# Patient Record
Sex: FEMALE
[De-identification: names, ages, dates, MRNs, and addresses within clinical notes are randomized per-mention and may not be internally consistent; named-entity substitution may affect disease eponyms.]

---

## 2017-07-05 ENCOUNTER — HOSPITAL ENCOUNTER (OUTPATIENT)
Dept: HOSPITAL 31 - C.ER | Age: 42
Setting detail: OBSERVATION
Discharge: HOME | End: 2017-07-05
Attending: EMERGENCY MEDICINE | Admitting: EMERGENCY MEDICINE
Payer: SELF-PAY

## 2017-07-05 VITALS — BODY MASS INDEX: 10.8 KG/M2

## 2017-07-05 VITALS — DIASTOLIC BLOOD PRESSURE: 62 MMHG | SYSTOLIC BLOOD PRESSURE: 95 MMHG | HEART RATE: 82 BPM | TEMPERATURE: 97.9 F

## 2017-07-05 VITALS — RESPIRATION RATE: 16 BRPM

## 2017-07-05 VITALS — OXYGEN SATURATION: 97 %
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
## 2017-07-05 DIAGNOSIS — Z87.891: ICD-10-CM

## 2017-07-05 DIAGNOSIS — F10.129: Primary | ICD-10-CM

## 2017-07-05 NOTE — C.PDOC
History Of Present Illness





A 43 y/o F was found drunk in front of a house PTA. Admits to drinking ETOH. 

Denies suicidal, homicidal ideation, or any physical complaints.





Time Seen by Provider: 07/05/17 01:07


Chief Complaint (Nursing): Substance Abuse


History Per: Patient


History/Exam Limitations: no limitations


Onset/Duration Of Symptoms: Hrs


Current Symptoms Are (Timing): Still Present


Suicide/Self Injury Attempted (Context): None


Modifying Factor(s): Alcohol


Severity: Mild


Associated Symptoms: denies: Suicidal Thoughts, Suicidal Plan


Involuntary Hold By: None


Recent travel outside of the [place]: No


Additional History Per: Patient





Past Medical History


Reviewed: Historical Data, Nursing Documentation, Vital Signs


Vital Signs: 


 Last Vital Signs











Temp  97.9 F   07/05/17 05:29


 


Pulse  82   07/05/17 05:29


 


Resp  16   07/05/17 05:29


 


BP  95/62 L  07/05/17 05:29


 


Pulse Ox  98   07/05/17 05:29














- Medical History


PMH: Bronchitis, Gall Bladder Disease, Migraine


   Denies: Chronic Kidney Disease


Surgical History: Appendectomy (2001), Tonsillectomy (At 9yo)





- [facility] Procedures








APPLICATION OF SPLINT (12/02/14)








Family History: States: Unknown Family Hx





- Social History


Hx Tobacco Use: Yes


Hx Alcohol Use: Yes


Hx Substance Use: Yes





- Immunization History


Hx Tetanus Toxoid Vaccination: No


Hx Influenza Vaccination: No


Hx Pneumococcal Vaccination: No





Review Of Systems


Constitutional: Positive for: Other (ETOH intoxicated).  Negative for: Fever, 

Chills, Sweats


Cardiovascular: Negative for: Chest Pain, Palpitations


Respiratory: Negative for: Shortness of Breath


Gastrointestinal: Negative for: Nausea, Vomiting, Abdominal Pain, Diarrhea


Skin: Negative for: Rash, Lesions, Bruising


Neurological: Negative for: Dizziness


Psych: Negative for: Anxiety, Suicidal ideation, Other (Homicidal ideation)





Physical Exam





- Physical Exam


Appears: Non-toxic, No Acute Distress, Other (ETOH intoxicated, (+) AOB, no 

signs of visible trauma)


Skin: Warm, Dry


Head: Atraumatic


Eye(s): bilateral: Normal Inspection


Oral Mucosa: Moist


Neck: Trachea Midline, Supple


Chest: Symmetrical


Cardiovascular: Rhythm Regular


Respiratory: No Rales, No Rhonchi, No Wheezing


Gastrointestinal/Abdominal: Soft, No Tenderness


Back: No CVA Tenderness


Neurological/Psych: Oriented x3 (Awake and alert)


Gait: Unsteady





ED Course And Treatment


O2 Sat by Pulse Oximetry: 97 (RA)


Pulse Ox Interpretation: Normal


Reevaluation Time: 05:48


Reassessment Condition: Improved





ED OBSERVATION


Discharge: Yes


Date of observation admission: 07/05/17


Time of observation admission: 01:34





- Observation admission statement


Patient is being placed in observation because:: 





acute alcohol intoxication








- Goals of Observation


Goals of observation are:: 





sobriety








- Progress Note


Progress Note: 





07/05/17 01:35


vitals stable





Disposition


Counseled Patient/Family Regarding: Studies Performed, Diagnosis, Need For 

Followup





- Disposition


Disposition: HOME/ ROUTINE


Disposition Time: 01:07


Condition: FAIR





- Clinical Impression


Clinical Impression: 


 Alcohol intoxication








- Scribe Statement


The provider has reviewed the documentation as recorded by the Scribsebastian snyder





All medical record entries made by the Beniibsebastian were at my direction and 

personally dictated by me. I have reviewed the chart and agree that the record 

accurately reflects my personal performance of the history, physical exam, 

medical decision making, and the department course for this patient. I have 

also personally directed, reviewed, and agree with the discharge instructions 

and disposition.

## 2018-01-29 ENCOUNTER — HOSPITAL ENCOUNTER (EMERGENCY)
Dept: HOSPITAL 31 - C.ER | Age: 43
Discharge: HOME | End: 2018-01-29
Payer: COMMERCIAL

## 2018-01-29 VITALS
DIASTOLIC BLOOD PRESSURE: 82 MMHG | RESPIRATION RATE: 19 BRPM | HEART RATE: 88 BPM | SYSTOLIC BLOOD PRESSURE: 120 MMHG | OXYGEN SATURATION: 95 % | TEMPERATURE: 97.7 F

## 2018-01-29 VITALS — BODY MASS INDEX: 25.9 KG/M2

## 2018-01-29 DIAGNOSIS — J18.9: Primary | ICD-10-CM

## 2018-01-29 DIAGNOSIS — Z87.891: ICD-10-CM

## 2018-01-29 PROCEDURE — 96365 THER/PROPH/DIAG IV INF INIT: CPT

## 2018-01-29 PROCEDURE — 87804 INFLUENZA ASSAY W/OPTIC: CPT

## 2018-01-29 PROCEDURE — 96372 THER/PROPH/DIAG INJ SC/IM: CPT

## 2018-01-29 PROCEDURE — 99283 EMERGENCY DEPT VISIT LOW MDM: CPT

## 2018-01-29 PROCEDURE — 71046 X-RAY EXAM CHEST 2 VIEWS: CPT

## 2018-01-29 PROCEDURE — 94640 AIRWAY INHALATION TREATMENT: CPT

## 2018-01-29 RX ADMIN — ALBUTEROL SULFATE SCH MG: 2.5 SOLUTION RESPIRATORY (INHALATION) at 16:15

## 2018-01-29 RX ADMIN — ALBUTEROL SULFATE SCH MG: 2.5 SOLUTION RESPIRATORY (INHALATION) at 16:00

## 2018-01-29 NOTE — C.PDOC
History Of Present Illness


43 y/o female presents to ED with complaints of right rib pain developing 4 am 

this morning associated with cough she has had for 2 weeks. Patient states she 

has had cold like symptoms and now developing rib pain she thinks is because of 

too much coughing. Patient denies fever, chills, abdominal pain, sob or any 

other complaints at this time. 


Time Seen by Provider: 18 13:48


Chief Complaint (Nursing): Cough, Cold, Congestion


History Per: Patient


History/Exam Limitations: no limitations


Onset/Duration Of Symptoms: Hrs


Current Symptoms Are (Timing): Still Present


Associated Symptoms: Cough





Past Medical History


Reviewed: Historical Data, Nursing Documentation, Vital Signs


Vital Signs: 


 Last Vital Signs











Temp  100.3 F H  18 12:46


 


Pulse  85   18 12:46


 


Resp  18   18 12:46


 


BP  125/80   18 12:46


 


Pulse Ox  96   18 15:37














- Medical History


PMH: Bronchitis, Gall Bladder Disease, Migraine


Surgical History: Appendectomy (), Tonsillectomy (At 9yo)





- [facility] Procedures








APPLICATION OF SPLINT (14)








Family History: States: No Known Family Hx





- Social History


Hx Tobacco Use: Yes


Hx Alcohol Use: Yes


Hx Substance Use: Yes





- Immunization History


Hx Tetanus Toxoid Vaccination: No


Hx Influenza Vaccination: No


Hx Pneumococcal Vaccination: No





Review Of Systems


Constitutional: Negative for: Fever, Chills


Respiratory: Positive for: Cough


Gastrointestinal: Negative for: Nausea, Vomiting, Abdominal Pain


Musculoskeletal: Positive for: Other (rib pain)





Physical Exam





- Physical Exam


Appears: Non-toxic, No Acute Distress


Skin: Warm, Dry, No Rash


Head: Atraumatic, Normacephalic


Eye(s): bilateral: Normal Inspection


Oral Mucosa: Moist


Neck: Normal ROM, Supple


Cardiovascular: Rhythm Regular


Respiratory: Normal Breath Sounds, No Rales, No Rhonchi, No Wheezing


Gastrointestinal/Abdominal: Soft, No Tenderness, No Guarding, No Rebound


Extremity: Normal ROM, Capillary Refill (<2 seconds)


Neurological/Psych: Oriented x3





ED Course And Treatment


ECG: Interpreted By Me, Viewed By Me


ECG Rhythm: Sinus Rhythm


Rate From EC (bpm)


O2 Sat by Pulse Oximetry: 96 (RA)


Pulse Ox Interpretation: Normal





- Other Rad


  ** CXR


X-Ray: Viewed By Me, Read By Radiologist


Interpretation: IMPRESSION:  Right lower lobe opacities noted within the medial 

right lower lobe as well as right mid to lower lateral lung zone possibly 

pleural based opacities. Correlate clinically for pneumonia. Recommend follow-

up to complete resolution.  Small right pleural effusion.  Mild biapical 

pleural thickening.  Mild cardiomegaly.


Progress Note: ECG, CXR ordered. Influenza test. Blood work, UA ordered.  CXR 

showed right lower lobe infiltrate.  Antibiotics IV given at ED.  Patient will 

be discharged with PO antibiotics





Disposition


Counseled Patient/Family Regarding: Studies Performed, Diagnosis, Need For 

Followup, Rx Given





- Disposition


Referrals: 


[facility] at [facility] [Outside]


Disposition: HOME/ ROUTINE


Disposition Time: 16:47


Condition: STABLE


Additional Instructions: 


Follow up with your doctor or our clinic. 


Prescriptions: 


Azithromycin 1 tab PO DAILY #6 tab


Ibuprofen [Motrin] 1 tab PO TID PRN #30 tab


 PRN Reason: Pain


Instructions:  Community Acquired Pneumonia (ED)


Forms:  [facility] (English), Work Excuse





- POA


Present On Arrival: None





- Clinical Impression


Clinical Impression: 


 Pneumonia








- Scribe Statement


The provider has reviewed the documentation as recorded by the Scribsebastian Perez





All medical record entries made by the Beniibsebastian were at my direction and 

personally dictated by me. I have reviewed the chart and agree that the record 

accurately reflects my personal performance of the history, physical exam, 

medical decision making, and the department course for this patient. I have 

also personally directed, reviewed, and agree with the discharge instructions 

and disposition.

## 2018-01-29 NOTE — RAD
HISTORY:

SOB  



COMPARISON:

Chest x-ray performed 4/25/17 



TECHNIQUE:

Chest PA and lateral



FINDINGS:





LUNGS:

Right lower lobe opacities noted within the medial right lower lobe 

as well as right mid to lower lateral lung zone possibly pleural 

based opacities. Recommend follow-up to complete resolution. Small 

right pleural effusion. No definite pneumothorax.  Mild biapical 

pleural thickening.



Please note that chest x-ray has limited sensitivity for the 

detection of pulmonary masses.



CARDIOVASCULAR:

Mild cardiomegaly.



OSSEOUS STRUCTURES:

 Degenerative changes.



VISUALIZED UPPER ABDOMEN:

Unremarkable.



OTHER FINDINGS:

None.



IMPRESSION:

Right lower lobe opacities noted within the medial right lower lobe 

as well as right mid to lower lateral lung zone possibly pleural 

based opacities. Correlate clinically for pneumonia. Recommend 

follow-up to complete resolution. 



Small right pleural effusion.



Mild biapical pleural thickening. 



Mild cardiomegaly.

## 2019-03-12 ENCOUNTER — HOSPITAL ENCOUNTER (OUTPATIENT)
Dept: HOSPITAL 31 - C.ER | Age: 44
Setting detail: OBSERVATION
LOS: 1 days | Discharge: HOME | End: 2019-03-13
Attending: FAMILY MEDICINE | Admitting: FAMILY MEDICINE
Payer: SELF-PAY

## 2019-03-12 VITALS — BODY MASS INDEX: 25.9 KG/M2

## 2019-03-12 DIAGNOSIS — I73.00: ICD-10-CM

## 2019-03-12 DIAGNOSIS — R07.89: ICD-10-CM

## 2019-03-12 DIAGNOSIS — I10: ICD-10-CM

## 2019-03-12 DIAGNOSIS — Z82.49: ICD-10-CM

## 2019-03-12 DIAGNOSIS — F14.10: Primary | ICD-10-CM

## 2019-03-12 DIAGNOSIS — Z90.49: ICD-10-CM

## 2019-03-12 DIAGNOSIS — M34.9: ICD-10-CM

## 2019-03-12 LAB
ALBUMIN SERPL-MCNC: 4.8 G/DL (ref 3.5–5)
ALBUMIN/GLOB SERPL: 1.5 {RATIO} (ref 1–2.1)
ALT SERPL-CCNC: 78 U/L (ref 9–52)
APTT BLD: 38 SECONDS (ref 21–34)
AST SERPL-CCNC: 123 U/L (ref 14–36)
BASOPHILS # BLD AUTO: 0.1 K/UL (ref 0–0.2)
BASOPHILS NFR BLD: 1.1 % (ref 0–2)
BUN SERPL-MCNC: 6 MG/DL (ref 7–17)
CALCIUM SERPL-MCNC: 9.8 MG/DL (ref 8.6–10.4)
EOSINOPHIL # BLD AUTO: 0.4 K/UL (ref 0–0.7)
EOSINOPHIL NFR BLD: 7.8 % (ref 0–4)
ERYTHROCYTE [DISTWIDTH] IN BLOOD BY AUTOMATED COUNT: 15.7 % (ref 11.5–14.5)
GFR NON-AFRICAN AMERICAN: > 60
HGB BLD-MCNC: 14.2 G/DL (ref 11–16)
INR PPP: 0.9
LYMPHOCYTES # BLD AUTO: 1.3 K/UL (ref 1–4.3)
LYMPHOCYTES NFR BLD AUTO: 23.9 % (ref 20–40)
MCH RBC QN AUTO: 35.8 PG (ref 27–31)
MCHC RBC AUTO-ENTMCNC: 32.8 G/DL (ref 33–37)
MCV RBC AUTO: 109.4 FL (ref 81–99)
MONOCYTES # BLD: 0.7 K/UL (ref 0–0.8)
MONOCYTES NFR BLD: 12 % (ref 0–10)
NEUTROPHILS # BLD: 3 K/UL (ref 1.8–7)
NEUTROPHILS NFR BLD AUTO: 55.2 % (ref 50–75)
NRBC BLD AUTO-RTO: 0.2 % (ref 0–2)
PLATELET # BLD: 202 K/UL (ref 130–400)
PMV BLD AUTO: 8.9 FL (ref 7.2–11.7)
PROTHROMBIN TIME: 9.9 SECONDS (ref 9.7–12.2)
RBC # BLD AUTO: 3.96 MIL/UL (ref 3.8–5.2)
WBC # BLD AUTO: 5.4 K/UL (ref 4.8–10.8)

## 2019-03-12 PROCEDURE — 76700 US EXAM ABDOM COMPLETE: CPT

## 2019-03-12 PROCEDURE — 93005 ELECTROCARDIOGRAM TRACING: CPT

## 2019-03-12 PROCEDURE — 80074 ACUTE HEPATITIS PANEL: CPT

## 2019-03-12 PROCEDURE — 85730 THROMBOPLASTIN TIME PARTIAL: CPT

## 2019-03-12 PROCEDURE — 86706 HEP B SURFACE ANTIBODY: CPT

## 2019-03-12 PROCEDURE — 80053 COMPREHEN METABOLIC PANEL: CPT

## 2019-03-12 PROCEDURE — 84100 ASSAY OF PHOSPHORUS: CPT

## 2019-03-12 PROCEDURE — 36415 COLL VENOUS BLD VENIPUNCTURE: CPT

## 2019-03-12 PROCEDURE — 84484 ASSAY OF TROPONIN QUANT: CPT

## 2019-03-12 PROCEDURE — 85025 COMPLETE CBC W/AUTO DIFF WBC: CPT

## 2019-03-12 PROCEDURE — 71045 X-RAY EXAM CHEST 1 VIEW: CPT

## 2019-03-12 PROCEDURE — 84443 ASSAY THYROID STIM HORMONE: CPT

## 2019-03-12 PROCEDURE — 83735 ASSAY OF MAGNESIUM: CPT

## 2019-03-12 PROCEDURE — 85610 PROTHROMBIN TIME: CPT

## 2019-03-12 PROCEDURE — 80061 LIPID PANEL: CPT

## 2019-03-12 PROCEDURE — 70450 CT HEAD/BRAIN W/O DYE: CPT

## 2019-03-12 NOTE — C.PDOC
History Of Present Illness


44 y/o female presents for evaluation of pressure-like chest pain since 

yesterday. Pain is described as mid-sternal, non-radiating, and began while 

patient was sitting down, non-exertional. Patient reports associated SOB. At 

present she is asymptomatic and has no pain. Patient did not take any medication

PTA. Patient admits she had similar pain last year, and underwent a stress test 

that was abnormal, but nothing came of it. Additionally, patient states that she

had been feeling dizzy for about a week when a few days ago she fell, hit her 

head, and passed out. She denies any other injury. No recent travel. No recent 

hospitalizations. Patient admits to mild cough. She denies any fevers, chills, 

nausea, vomiting, or change in bowel movements. Patients blood pressure was 

found to be elevated at 175/131 on arrival. At time of examination BP is now 

155/92, without medical intervention. Patient states she has no prior hx of 

hypertension. 





Time Seen by Provider: 19 19:12


Chief Complaint (Nursing): Chest Pain


History Per: Patient


History/Exam Limitations: no limitations


Onset/Duration Of Symptoms: Days (x 1)


Current Symptoms Are (Timing): Gone


Quality: Pressure


Alleviating Factors: None





Past Medical History


Reviewed: Historical Data, Nursing Documentation, Vital Signs


Vital Signs: 





                                Last Vital Signs











Temp  98.4 F   19 17:56


 


Pulse  84   19 19:05


 


Resp  17   19 19:05


 


BP  155/92 H  19 19:05


 


Pulse Ox  97   19 19:05














- Medical History


PMH: Bronchitis, Gall Bladder Disease, Migraine


   Denies: Chronic Kidney Disease


Surgical History: Appendectomy (), Tonsillectomy (At 11yo)





- [facility] Procedures











APPLICATION OF SPLINT (14)








Family History: States: Unknown Family Hx





- Social History


Hx Tobacco Use: Yes


Hx Alcohol Use: Yes


Hx Substance Use: Yes





- Immunization History


Hx Tetanus Toxoid Vaccination: No


Hx Influenza Vaccination: No


Hx Pneumococcal Vaccination: No





Review Of Systems


Except As Marked, All Systems Reviewed And Found Negative.


Constitutional: Negative for: Fever


Eyes: Negative for: Vision Change


Cardiovascular: Positive for: Chest Pain.  Negative for: Palpitations


Respiratory: Positive for: Cough, Shortness of Breath.  Negative for: Pleuritic 

Pain


Gastrointestinal: Negative for: Nausea, Vomiting, Abdominal Pain, Diarrhea


Musculoskeletal: Negative for: Back Pain


Skin: Negative for: Rash


Neurological: Positive for: Dizziness, Other (Syncopal episode).  Negative for: 

Weakness, Numbness, Headache





Physical Exam





- Physical Exam


Appears: Non-toxic, No Acute Distress


Skin: Warm, Dry, No Rash


Head: Normacephalic, Swelling (Hematoma to the right forehead)


Eye(s): bilateral: Normal Inspection (conjunctiva clear), PERRL, EOMI


Oral Mucosa: Moist


Neck: Normal ROM


Chest: Symmetrical, No Tenderness


Cardiovascular: Rhythm Regular, No Murmur, Other (Normal S1,S2)


Respiratory: No Rales, No Rhonchi, No Wheezing, Other (Lungs CTA bilaterally)


Gastrointestinal/Abdominal: Soft, No Tenderness, No Distention, No Guarding, No 

Rebound


Extremity: Bilateral: Atraumatic, Normal Color And Temperature, Other (no 

cyanosis or edema)


Pulses: Left Dorsalis Pedis: Normal, Right Dorsalis Pedis: Normal


Neurological/Psych: Oriented x3, Normal Speech, Normal Cranial Nerves (2-12 

intact), Normal Motor, Normal Sensation, Other (GCS of 15)





ED Course And Treatment





- Laboratory Results


Result Diagrams: 


                                 19 20:03





                                 19 20:03


ECG: Interpreted By Me


ECG Rhythm: Sinus Rhythm


Interpretation Of ECG: Normal intervals, Right axis deviation, No ST elevations 

or depressions


Rate From EC


O2 Sat by Pulse Oximetry: 97 (RA)


Pulse Ox Interpretation: Normal





- CT Scan/US


  ** CT Head


Other Rad Studies (CT/US): Read By Radiologist, Radiology Report Reviewed


CT/US Interpretation: EXAM:  CT Head without Intravenous Contrast.  CLINICAL 

HISTORY:  Trauma/syncope.  TECHNIQUE:  Axial computed tomography images of the 

head/brain without intravenous contrast.  COMPARISON:  None provided.  FINDINGS:

 BRAIN.  No acute intraparenchymal hemorrhage. No mass lesion. No CT evidence 

for acute territorial infarct. No midline shift or extra-axial collections.  

VENTRICLES:  No hydrocephalus.  ORBITS:  The orbits are unremarkable.  SINUSES 

AND MASTOIDS:  Bilateral ethmoid and maxillary sinusitis.  The mastoid air cells

are clear.  BONES:  No fracture.  SOFT TISSUES:  Unremarkable.  IMPRESSION:  S

inusitis.  No acute intracranial abnormality.





Medical Decision Making


Medical Decision Making: 





Impression: Chest pain





Plan:


- EKG


- CXR


- Labs


- Head CT





Progress:


CT shows sinusitis, no acute abnormality.





Discussed lab results with patient, urine positive for cocaine, she reports 

chest pain at this time, advised she should be admitted, patient agrees with 

plan.





Spoke with Dr. Juarez who accepted patient.








Disposition


Counseled Patient/Family Regarding: Studies Performed, Diagnosis





- Disposition


Disposition: HOSPITALIZED


Disposition Time: 22:00


Condition: STABLE





- Clinical Impression


Clinical Impression: 


 Cocaine abuse, Chest pain








- Scribe Statement


The provider has reviewed the documentation as recorded by the Shana Moss





Provider Attestation: 


All medical record entries made by the Shana were at my direction and 

personally dictated by me. I have reviewed the chart and agree that the record 

accurately reflects my personal performance of the history, physical exam, 

medical decision making, and the department course for this patient. I have also

personally directed, reviewed, and agree with the discharge instructions and 

disposition.

## 2019-03-12 NOTE — CP.PCM.HP
<Will Toro - Last Filed: 19 04:34>





History of Present Illness





- History of Present Illness


History of Present Illness: 





H&P for Dr. Juarez.





43 F w/ PMhx of HTN, schleroderma, raynauds presents to ED for 2 day of chest  

pain. Patient states pain for past 2 days was intermit, w/ radiation to R 

shoulder, 7/10 at its peak. Pt reports SOB associated with chest pain & 

dizziness. Patient reports having gotten dizzy approximately 2 days prior, 

resulting her to fall and hit her head against furniture. Patient denies recent 

travel, ill contacts, being sick. 





ROS: Denies abdominal pain, lower extremity pain, constipation, diarrhea, 

nausea, vomiting 





PMD: Denies 


PMHx: HTN 


PSHx: Appendectomy, tonsilectomy


Meds: None


Allergies: Pseudoephedrine anaphylaxis


Family Hx: father MI in 40s,  of CHF at 71; mother,  of CVA at 54; 

brother, MI at 38


SHx: + cocaine use last week, denies ETOH (previous hx ETOH abuse), denies other

drug use, smokes 1 pack per day, 20 + year, denies ETOH use  





Present on Admission





- Present on Admission


Any Indicators Present on Admission: No


History of DVT/PE: No


History of Uncontrolled Diabetes: No


Urinary Catheter: No


Decubitus Ulcer Present: No





Review of Systems





- Constitutional


Constitutional: absent: Chills, Fever





- EENT


Eyes: absent: Blurred Vision, Change in Vision


Nose/Mouth/Throat: absent: Nasal Congestion, Nose Pain





- Cardiovascular


Cardiovascular: Chest Pain, Chest Pain at Rest.  absent: Dyspnea





- Respiratory


Respiratory: Dyspnea





- Gastrointestinal


Gastrointestinal: absent: Abdominal Pain, Bloating





- Genitourinary


Genitourinary: absent: Change in Urinary Stream, Hematuria





- Musculoskeletal


Musculoskeletal: absent: Arthralgias, Myalgias





- Neurological


Neurological: absent: Abnormal Gait, Abnormal Hearing





- Psychiatric


Psychiatric: absent: Confusion, Depression





Past Patient History





- Past Medical History & Family History


Past Medical History?: Yes





- Past Social History


Smoking Status: Heavy Smoker > 10 Cigarettes Daily





- CARDIAC


Hx Cardiac Disorders: Yes (See comment)


Hx Circulatory Problems: Yes (Reynaud's)


Other/Comment: Scleraderma.  Systematic sclorosis





- PULMONARY


Hx Bronchitis: Yes





- NEUROLOGICAL


Hx Migraine: Yes





- HEENT


Hx HEENT Problems: Yes


Hx Epistaxis: Yes





- RENAL


Hx Chronic Kidney Disease: No





- ENDOCRINE/METABOLIC


Hx Endocrine Disorders: No





- HEMATOLOGICAL/ONCOLOGICAL


Hx Blood Disorders: No





- INTEGUMENTARY


Hx Dermatological Problems: No





- MUSCULOSKELETAL/RHEUMATOLOGICAL


Hx Musculoskeletal Disorders: Yes


Hx Back Pain: Yes


Hx Falls: No





- GASTROINTESTINAL


Hx Gall Bladder Disease: Yes





- GENITOURINARY/GYNECOLOGICAL


Hx Genitourinary Disorders: No





- PSYCHIATRIC


Hx Substance Use: Yes





- SURGICAL HISTORY


Hx Appendectomy: Yes ()


Hx Tonsillectomy: Yes (At 11yo)





- ANESTHESIA


Hx Anesthesia: Yes


Hx Anesthesia Reactions: No


Hx Malignant Hyperthermia: No





Meds


Allergies/Adverse Reactions: 


                                    Allergies











Allergy/AdvReac Type Severity Reaction Status Date / Time


 


pseudoephedrine HCl Allergy  ANGIOEDEMA Verified 18 12:44





[From Sudafed]     














Physical Exam





- Constitutional


Appears: Non-toxic, No Acute Distress





- Head Exam


Additional comments: 





R forehead bruise/ resolving  





- Eye Exam


Eye Exam: EOMI, Normal appearance


Pupil Exam: NORMAL ACCOMODATION





- ENT Exam


ENT Exam: Mucous Membranes Moist





- Respiratory Exam


Respiratory Exam: Clear to Auscultation Bilateral, NORMAL BREATHING PATTERN.  

absent: Rales, Rhonchi, Wheezes





- Cardiovascular Exam


Cardiovascular Exam: +S1, +S2.  absent: Systolic Murmur





- GI/Abdominal Exam


GI & Abdominal Exam: Normal Bowel Sounds, Soft, Tenderness.  absent: Distended, 

Firm


Additional comments: 





+ muprhy sign 





- Extremities Exam


Extremities exam: Positive for: full ROM, normal inspection.  Negative for: calf

tenderness, pedal edema





- Back Exam


Back exam: CVA tenderness (L), CVA tenderness (R)





- Neurological Exam


Neurological exam: Alert, Oriented x3





- Psychiatric Exam


Psychiatric exam: Normal Affect, Normal Mood





- Skin


Skin Exam: Dry, Intact, Normal Color, Warm





Results





- Vital Signs


Recent Vital Signs: 





                                Last Vital Signs











Temp  98.4 F   19 17:56


 


Pulse  84   19 19:05


 


Resp  17   19 19:05


 


BP  155/92 H  19 19:05


 


Pulse Ox  97   19 22:14














- Labs


Result Diagrams: 


                                 19 20:03





                                 19 20:03


Labs: 





                         Laboratory Results - last 24 hr











  19





  20:03 20:03 20:03


 


WBC  5.4  


 


RBC  3.96  


 


Hgb  14.2  


 


Hct  43.3  


 


MCV  109.4 H D  


 


MCH  35.8 H  


 


MCHC  32.8 L  


 


RDW  15.7 H  


 


Plt Count  202  


 


MPV  8.9  


 


Neut % (Auto)  55.2  


 


Lymph % (Auto)  23.9  


 


Mono % (Auto)  12.0 H  


 


Eos % (Auto)  7.8 H  


 


Baso % (Auto)  1.1  


 


Neut # (Auto)  3.0  


 


Lymph # (Auto)  1.3  


 


Mono # (Auto)  0.7  


 


Eos # (Auto)  0.4  


 


Baso # (Auto)  0.1  


 


PT   9.9 


 


INR   0.9 


 


APTT   38 H 


 


Sodium    140


 


Potassium    3.6


 


Chloride    101


 


Carbon Dioxide    28


 


Anion Gap    15


 


BUN    6 L


 


Creatinine    0.7


 


Est GFR ( Amer)    > 60


 


Est GFR (Non-Af Amer)    > 60


 


Random Glucose    53 L D


 


Calcium    9.8


 


Total Bilirubin    0.9


 


AST    123 H


 


ALT    78 H


 


Alkaline Phosphatase    80


 


Troponin I    < 0.0120


 


Total Protein    8.1


 


Albumin    4.8


 


Globulin    3.3


 


Albumin/Globulin Ratio    1.5


 


Urine Opiates Screen   


 


Urine Methadone Screen   


 


Ur Barbiturates Screen   


 


Ur Phencyclidine Scrn   


 


Ur Amphetamines Screen   


 


U Benzodiazepines Scrn   


 


U Oth Cocaine Metabols   


 


U Cannabinoids Screen   














  19





  20:45


 


WBC 


 


RBC 


 


Hgb 


 


Hct 


 


MCV 


 


MCH 


 


MCHC 


 


RDW 


 


Plt Count 


 


MPV 


 


Neut % (Auto) 


 


Lymph % (Auto) 


 


Mono % (Auto) 


 


Eos % (Auto) 


 


Baso % (Auto) 


 


Neut # (Auto) 


 


Lymph # (Auto) 


 


Mono # (Auto) 


 


Eos # (Auto) 


 


Baso # (Auto) 


 


PT 


 


INR 


 


APTT 


 


Sodium 


 


Potassium 


 


Chloride 


 


Carbon Dioxide 


 


Anion Gap 


 


BUN 


 


Creatinine 


 


Est GFR ( Amer) 


 


Est GFR (Non-Af Amer) 


 


Random Glucose 


 


Calcium 


 


Total Bilirubin 


 


AST 


 


ALT 


 


Alkaline Phosphatase 


 


Troponin I 


 


Total Protein 


 


Albumin 


 


Globulin 


 


Albumin/Globulin Ratio 


 


Urine Opiates Screen  Negative


 


Urine Methadone Screen  Negative


 


Ur Barbiturates Screen  Negative


 


Ur Phencyclidine Scrn  Negative


 


Ur Amphetamines Screen  Negative


 


U Benzodiazepines Scrn  Negative


 


U Oth Cocaine Metabols  Positive H


 


U Cannabinoids Screen  Negative














Assessment & Plan





- Assessment and Plan (Free Text)


Assessment: 





43 F w/ PMhx of HTN, raynauds, scleroderma, presents with dizziness and chest 

pain 


Plan: 





Chest Pain


R/o ACS


- UDS cocaine +


- EKG NSR w/  BPM


- CXRAY: barel shape chest, no acute disease identified, will f/u official read


- SHINE X 2 negative F/u 3rd SHINE


- F/u Lipid, TSH 


- ECHO   EF 65%


- c/w aspirin 81 mg daily





Abdominal pain


- F/u Abd U/S


- F/u hepatitis panel 


- T. Bili 0/9


- AST/ ALT elevated 123/78





History of Hypertensive


- Avoid beta blockers 


- BP 150s, 1 X IV enalapril 2.5 mg, will monitor 





PPx


- DVT: lovenox 40 SC, SCDs


- heart health diet  





<Roly Juarez - Last Filed: 19 05:53>





Results





- Vital Signs


Recent Vital Signs: 





                                Last Vital Signs











Temp  98.2 F   19 03:02


 


Pulse  60   19 03:02


 


Resp  20   19 03:02


 


BP  155/91 H  19 03:02


 


Pulse Ox  97   19 03:02














- Labs


Result Diagrams: 


                                 19 04:59





                                 19 20:03


Labs: 





                         Laboratory Results - last 24 hr











  19





  20:03 20:03 20:03


 


WBC  5.4  


 


RBC  3.96  


 


Hgb  14.2  


 


Hct  43.3  


 


MCV  109.4 H D  


 


MCH  35.8 H  


 


MCHC  32.8 L  


 


RDW  15.7 H  


 


Plt Count  202  


 


MPV  8.9  


 


Neut % (Auto)  55.2  


 


Lymph % (Auto)  23.9  


 


Mono % (Auto)  12.0 H  


 


Eos % (Auto)  7.8 H  


 


Baso % (Auto)  1.1  


 


Neut # (Auto)  3.0  


 


Lymph # (Auto)  1.3  


 


Mono # (Auto)  0.7  


 


Eos # (Auto)  0.4  


 


Baso # (Auto)  0.1  


 


PT   9.9 


 


INR   0.9 


 


APTT   38 H 


 


Sodium    140


 


Potassium    3.6


 


Chloride    101


 


Carbon Dioxide    28


 


Anion Gap    15


 


BUN    6 L


 


Creatinine    0.7


 


Est GFR ( Amer)    > 60


 


Est GFR (Non-Af Amer)    > 60


 


Random Glucose    53 L D


 


Calcium    9.8


 


Total Bilirubin    0.9


 


AST    123 H


 


ALT    78 H


 


Alkaline Phosphatase    80


 


Total Creatine Kinase   


 


CK-MB (Mass)   


 


Troponin I    < 0.0120


 


Total Protein    8.1


 


Albumin    4.8


 


Globulin    3.3


 


Albumin/Globulin Ratio    1.5


 


LDL Cholesterol Direct   


 


TSH 3rd Generation   


 


Urine Opiates Screen   


 


Urine Methadone Screen   


 


Ur Barbiturates Screen   


 


Ur Phencyclidine Scrn   


 


Ur Amphetamines Screen   


 


U Benzodiazepines Scrn   


 


U Oth Cocaine Metabols   


 


U Cannabinoids Screen   


 


Alcohol, Quantitative   


 


Hep Bs Antigen   














  19





  20:45 04:09 04:59


 


WBC   


 


RBC   


 


Hgb   


 


Hct   


 


MCV   


 


MCH   


 


MCHC   


 


RDW   


 


Plt Count   


 


MPV   


 


Neut % (Auto)   


 


Lymph % (Auto)   


 


Mono % (Auto)   


 


Eos % (Auto)   


 


Baso % (Auto)   


 


Neut # (Auto)   


 


Lymph # (Auto)   


 


Mono # (Auto)   


 


Eos # (Auto)   


 


Baso # (Auto)   


 


PT   


 


INR   


 


APTT   


 


Sodium   


 


Potassium   


 


Chloride   


 


Carbon Dioxide   


 


Anion Gap   


 


BUN   


 


Creatinine   


 


Est GFR ( Amer)   


 


Est GFR (Non-Af Amer)   


 


Random Glucose   


 


Calcium   


 


Total Bilirubin   


 


AST   


 


ALT   


 


Alkaline Phosphatase   


 


Total Creatine Kinase   62 


 


CK-MB (Mass)   0.41 


 


Troponin I   0.0150 


 


Total Protein   


 


Albumin   


 


Globulin   


 


Albumin/Globulin Ratio   


 


LDL Cholesterol Direct   


 


TSH 3rd Generation   


 


Urine Opiates Screen  Negative  


 


Urine Methadone Screen  Negative  


 


Ur Barbiturates Screen  Negative  


 


Ur Phencyclidine Scrn  Negative  


 


Ur Amphetamines Screen  Negative  


 


U Benzodiazepines Scrn  Negative  


 


U Oth Cocaine Metabols  Positive H  


 


U Cannabinoids Screen  Negative  


 


Alcohol, Quantitative   < 10 


 


Hep Bs Antigen    Negative














  19





  04:59 04:59


 


WBC  4.7 L 


 


RBC  3.86 


 


Hgb  13.8 


 


Hct  42.1 


 


MCV  109.2 H 


 


MCH  35.8 H 


 


MCHC  32.8 L 


 


RDW  15.2 H 


 


Plt Count  179 


 


MPV  8.7 


 


Neut % (Auto)  47.4 L 


 


Lymph % (Auto)  30.6 


 


Mono % (Auto)  13.4 H 


 


Eos % (Auto)  7.4 H 


 


Baso % (Auto)  1.2 


 


Neut # (Auto)  2.2 


 


Lymph # (Auto)  1.4 


 


Mono # (Auto)  0.6 


 


Eos # (Auto)  0.3 


 


Baso # (Auto)  0.1 


 


PT  


 


INR  


 


APTT  


 


Sodium  


 


Potassium  


 


Chloride  


 


Carbon Dioxide  


 


Anion Gap  


 


BUN  


 


Creatinine  


 


Est GFR ( Amer)  


 


Est GFR (Non-Af Amer)  


 


Random Glucose  


 


Calcium  


 


Total Bilirubin  


 


AST  


 


ALT  


 


Alkaline Phosphatase  


 


Total Creatine Kinase  


 


CK-MB (Mass)  


 


Troponin I  


 


Total Protein  


 


Albumin  


 


Globulin  


 


Albumin/Globulin Ratio  


 


LDL Cholesterol Direct   113


 


TSH 3rd Generation   4.45


 


Urine Opiates Screen  


 


Urine Methadone Screen  


 


Ur Barbiturates Screen  


 


Ur Phencyclidine Scrn  


 


Ur Amphetamines Screen  


 


U Benzodiazepines Scrn  


 


U Oth Cocaine Metabols  


 


U Cannabinoids Screen  


 


Alcohol, Quantitative  


 


Hep Bs Antigen  














Assessment & Plan





- Date & Time


Date: 19 (I have seen and examined the patient.  I agree with the findings

and plan of care as documented by Dr. Toro.  Patient with chest pain.  Cocaine 

abuse.  Avoid beta blockers.  ROMIx3 with EKG.  Aspirin and Statin.  Abdominal 

pain.  Check Ultrasound.  Check hep panel.  History of scleroderma.  Not 

currently on any medications.  Monitor for acute changes.)


Time: 05:51





Attending/Attestation





- Attestation


I have personally seen and examined this patient.: Yes


I have fully participated in the care of the patient.: Yes


I have reviewed all pertinent clinical information: Yes

## 2019-03-13 VITALS — RESPIRATION RATE: 20 BRPM | OXYGEN SATURATION: 98 % | TEMPERATURE: 97.4 F

## 2019-03-13 VITALS — HEART RATE: 64 BPM | DIASTOLIC BLOOD PRESSURE: 74 MMHG | SYSTOLIC BLOOD PRESSURE: 100 MMHG

## 2019-03-13 LAB
ALBUMIN SERPL-MCNC: 4.4 G/DL (ref 3.5–5)
ALBUMIN/GLOB SERPL: 1.4 {RATIO} (ref 1–2.1)
ALT SERPL-CCNC: 55 U/L (ref 9–52)
AST SERPL-CCNC: 124 U/L (ref 14–36)
BASOPHILS # BLD AUTO: 0.1 K/UL (ref 0–0.2)
BASOPHILS NFR BLD: 1.2 % (ref 0–2)
BUN SERPL-MCNC: 11 MG/DL (ref 7–17)
CALCIUM SERPL-MCNC: 9 MG/DL (ref 8.6–10.4)
CK MB SERPL-MCNC: 0.41 NG/ML (ref 0–3.38)
CK MB SERPL-MCNC: 0.66 NG/ML (ref 0–3.38)
EOSINOPHIL # BLD AUTO: 0.3 K/UL (ref 0–0.7)
EOSINOPHIL NFR BLD: 7.4 % (ref 0–4)
ERYTHROCYTE [DISTWIDTH] IN BLOOD BY AUTOMATED COUNT: 15.2 % (ref 11.5–14.5)
GFR NON-AFRICAN AMERICAN: > 60
HDLC SERPL-MCNC: 93 MG/DL (ref 30–70)
HEPATITIS A IGM: NEGATIVE
HEPATITIS B CORE AB: NEGATIVE
HEPATITIS C ANTIBODY: NEGATIVE
HGB BLD-MCNC: 13.8 G/DL (ref 11–16)
LDLC SERPL-MCNC: 113 MG/DL (ref 0–129)
LYMPHOCYTES # BLD AUTO: 1.4 K/UL (ref 1–4.3)
LYMPHOCYTES NFR BLD AUTO: 30.6 % (ref 20–40)
MCH RBC QN AUTO: 35.8 PG (ref 27–31)
MCHC RBC AUTO-ENTMCNC: 32.8 G/DL (ref 33–37)
MCV RBC AUTO: 109.2 FL (ref 81–99)
MONOCYTES # BLD: 0.6 K/UL (ref 0–0.8)
MONOCYTES NFR BLD: 13.4 % (ref 0–10)
NEUTROPHILS # BLD: 2.2 K/UL (ref 1.8–7)
NEUTROPHILS NFR BLD AUTO: 47.4 % (ref 50–75)
NRBC BLD AUTO-RTO: 0.1 % (ref 0–2)
PLATELET # BLD: 179 K/UL (ref 130–400)
PMV BLD AUTO: 8.7 FL (ref 7.2–11.7)
RBC # BLD AUTO: 3.86 MIL/UL (ref 3.8–5.2)
WBC # BLD AUTO: 4.7 K/UL (ref 4.8–10.8)

## 2019-03-13 NOTE — CP.PCM.DIS
Provider





- Provider


Date of Admission: 


03/12/19 22:05





Attending physician: 


Roly Juarez MD





Time Spent in preparation of Discharge (in minutes): 35





Diagnosis





- Discharge Diagnosis


(1) Non-cardiac chest pain


Status: Resolved   





(2) History of scleroderma


Status: Chronic   





(3) Alcohol abuse


Status: Chronic   





(4) Cocaine abuse


Status: Chronic   





Hospital Course





- Lab Results


Lab Results: 


                             Most Recent Lab Values











WBC  4.7 K/uL (4.8-10.8)  L  03/13/19  04:59    


 


RBC  3.86 Mil/uL (3.80-5.20)   03/13/19  04:59    


 


Hgb  13.8 g/dL (11.0-16.0)   03/13/19  04:59    


 


Hct  42.1 % (34.0-47.0)   03/13/19  04:59    


 


MCV  109.2 fL (81.0-99.0)  H  03/13/19  04:59    


 


MCH  35.8 pg (27.0-31.0)  H  03/13/19  04:59    


 


MCHC  32.8 g/dL (33.0-37.0)  L  03/13/19  04:59    


 


RDW  15.2 % (11.5-14.5)  H  03/13/19  04:59    


 


Plt Count  179 K/uL (130-400)   03/13/19  04:59    


 


MPV  8.7 fL (7.2-11.7)   03/13/19  04:59    


 


Neut % (Auto)  47.4 % (50.0-75.0)  L  03/13/19  04:59    


 


Lymph % (Auto)  30.6 % (20.0-40.0)   03/13/19  04:59    


 


Mono % (Auto)  13.4 % (0.0-10.0)  H  03/13/19  04:59    


 


Eos % (Auto)  7.4 % (0.0-4.0)  H  03/13/19  04:59    


 


Baso % (Auto)  1.2 % (0.0-2.0)   03/13/19  04:59    


 


Neut # (Auto)  2.2 K/uL (1.8-7.0)   03/13/19  04:59    


 


Lymph # (Auto)  1.4 K/uL (1.0-4.3)   03/13/19  04:59    


 


Mono # (Auto)  0.6 K/uL (0.0-0.8)   03/13/19  04:59    


 


Eos # (Auto)  0.3 K/uL (0.0-0.7)   03/13/19  04:59    


 


Baso # (Auto)  0.1 K/uL (0.0-0.2)   03/13/19  04:59    


 


PT  9.9 SECONDS (9.7-12.2)   03/12/19  20:03    


 


INR  0.9   03/12/19  20:03    


 


APTT  38 SECONDS (21-34)  H  03/12/19  20:03    


 


Sodium  136 mmol/L (132-148)   03/13/19  04:59    


 


Potassium  5.0 mmol/L (3.6-5.2)   03/13/19  04:59    


 


Chloride  102 mmol/L ()   03/13/19  04:59    


 


Carbon Dioxide  29 mmol/L (22-30)   03/13/19  04:59    


 


Anion Gap  9  (10-20)  L  03/13/19  04:59    


 


BUN  11 mg/dL (7-17)   03/13/19  04:59    


 


Creatinine  0.7 mg/dL (0.7-1.2)   03/13/19  04:59    


 


Est GFR ( Amer)  > 60   03/13/19  04:59    


 


Est GFR (Non-Af Amer)  > 60   03/13/19  04:59    


 


Random Glucose  89 mg/dL ()  D 03/13/19  04:59    


 


Calcium  9.0 mg/dl (8.6-10.4)   03/13/19  04:59    


 


Phosphorus  4.9 mg/dL (2.5-4.5)  H  03/13/19  04:59    


 


Magnesium  1.9 mg/dL (1.6-2.3)   03/13/19  04:59    


 


Total Bilirubin  1.1 mg/dL (0.2-1.3)   03/13/19  04:59    


 


AST  124 U/L (14-36)  H  03/13/19  04:59    


 


ALT  55 U/L (9-52)  H D 03/13/19  04:59    


 


Alkaline Phosphatase  66 U/L ()   03/13/19  04:59    


 


Total Creatine Kinase  69 U/L ()   03/13/19  09:12    


 


CK-MB (Mass)  0.66 ng/mL (0.0-3.38)   03/13/19  09:12    


 


Troponin I  < 0.0120 ng/mL (0.00-0.120)   03/13/19  09:12    


 


Total Protein  7.5 g/dL (6.3-8.3)   03/13/19  04:59    


 


Albumin  4.4 g/dL (3.5-5.0)   03/13/19  04:59    


 


Globulin  3.1 gm/dL (2.2-3.9)   03/13/19  04:59    


 


Albumin/Globulin Ratio  1.4  (1.0-2.1)   03/13/19  04:59    


 


Triglycerides  161 mg/dL (0-149)  H D 03/13/19  04:59    


 


Cholesterol  214 mg/dL (0-199)  H  03/13/19  04:59    


 


LDL Cholesterol Direct  113 mg/dL (0-129)   03/13/19  04:59    


 


HDL Cholesterol  93 mg/dL (30-70)  H  03/13/19  04:59    


 


TSH 3rd Generation  4.45 mIU/L (0.46-4.68)   03/13/19  04:59    


 


Urine Opiates Screen  Negative  (NEGATIVE)   03/12/19  20:45    


 


Urine Methadone Screen  Negative  (NEGATIVE)   03/12/19  20:45    


 


Ur Barbiturates Screen  Negative  (NEGATIVE)   03/12/19  20:45    


 


Ur Phencyclidine Scrn  Negative  (NEGATIVE)   03/12/19  20:45    


 


Ur Amphetamines Screen  Negative  (NEGATIVE)   03/12/19  20:45    


 


U Benzodiazepines Scrn  Negative  (NEGATIVE)   03/12/19  20:45    


 


U Oth Cocaine Metabols  Positive  (NEGATIVE)  H  03/12/19  20:45    


 


U Cannabinoids Screen  Negative  (NEGATIVE)   03/12/19  20:45    


 


Alcohol, Quantitative  < 10 mg/dl (0-10)   03/13/19  04:09    


 


Hepatitis A IgM Ab  Negative  (NEGATIVE)   03/13/19  04:59    


 


Hep Bs Antigen  Negative  (NEGATIVE)   03/13/19  04:59    


 


Hep Bs Antibody  Negative  (NEGATIVE)   03/13/19  04:59    


 


Hep B Core IgM Ab  Negative  (NEGATIVE)   03/13/19  04:59    


 


Hepatitis C Antibody  Negative  (NEGATIVE)   03/13/19  04:59    














- Hospital Course


Hospital Course: 





On admission:


43 F w/ PMhx of HTN, scleroderma, raynauds presents to ED for 2 day of chest  

pain. Patient states pain for past 2 days was intermit, w/ radiation to R 

shoulder, 7/10 at its peak. Pt reports SOB associated with chest pain & 

dizziness. Patient reports having gotten dizzy approximately 2 days prior, 

resulting her to fall and hit her head against furniture. Patient denies recent 

travel, ill contacts, being sick. 





Hospital course:


Pt's UDS was postive for cocaine, so she did not receive beta blockers. She 

received NTG SL 0.4 mg with some improvement of chest pain. Head CT done in the 

ED was negative for acute intracranial pathology. EKG showed sinus bradycardia, 

no acute STTW changes. CXR showed no active disease. Her transaminitis was 

typical of 2 to 1 AS to ALT ratio in setting of chronic alcohol abuse, but due 

to RUQ tenderness and questionable murphys sign and abdominal US was ordered. It

showed no acute abnormalities, no cholelithiasis or signs of cholecystitis. Her 

chest pain resolved the morning after admission, and body aches were responsive 

to Motrin.





This is a summary of the hospital course. Please see EMR for full details.











Pt is medically stable for discharge as per Dr. Wheeler.





Prescriptions needed:


Gabapentin 300 mg 1 tab by mouth once daily at nighttime.


Folate 1 mg 1 tab by mouth once daily at 8 am. 


Thiamine 100 mg  1 tab by mouth once daily at 8 am.


Multivitamins 1 tab by mouth once daily at 8 am





Pt should continue taking prior medication as previously prescribed. 





Pt counseled on against using alcohol to excess and using cocaine, and their 

negative effects on health.





Pt should follow up with PMD, or [facility], 

within 2 weeks of discharge. Call [phone] to make an appointment.





Should symptoms worsen, please head to the Emergency Department for further e

valuation.





Instructions explained to the pt, who understands and agrees with discharge 

plan.





Discharge Exam





- Head Exam


Head Exam: ATRAUMATIC, NORMAL INSPECTION





- Eye Exam


Eye Exam: EOMI, Normal appearance





- ENT Exam


ENT Exam: Mucous Membranes Moist





- Respiratory Exam


Respiratory Exam: Clear to PA & Lateral.  absent: Rales, Rhonchi, Wheezes, 

Respiratory Distress





- Cardiovascular Exam


Cardiovascular Exam: REGULAR RHYTHM, +S1, +S2





- GI/Abdominal Exam


GI & Abdominal Exam: Normal Bowel Sounds, Soft.  absent: Distended, Firm, 

Guarding, Rebound, Rigid, Tenderness





- Back Exam


Back exam: NORMAL INSPECTION.  absent: CVA tenderness (L), CVA tenderness (R)





- Neurological Exam


Neurological exam: Alert, Oriented x3





- Psychiatric Exam


Psychiatric exam: Normal Affect, Normal Mood





- Skin


Skin Exam: Dry, Intact, Normal Color, Warm





Discharge Plan





- Discharge Medications


Prescriptions: 


Folic Acid 1 mg PO DAILY #30 tab


Gabapentin [Neurontin] 300 mg PO DAILY #30 cap


Multivitamins [Hexavitamin] 1 tab PO DAILY #30 tab


Thiamine [Vitamin B1 Tab] 100 mg PO DAILY #30 tab





- Follow Up Plan


Condition: STABLE


Disposition: HOME/ ROUTINE


Instructions:  Smoking: Not Just Harmful to Your Lungs and Heart, Chest Pain 

(DC), Folic Acid, Gabapentin, Thiamine


Additional Instructions: 


Pt is medically stable as per Dr. Wheeler.





Prescriptions needed:


Gabapentin 300 mg 1 tab by mouth once daily at nighttime.


Folate 1 mg 1 tab by mouth once daily at 8 am. 


Thiamine 100 mg  1 tab by mouth once daily at 8 am.


Multivitamins 1 tab by mouth once daily at 8 am





Pt should continue taking prior medication as previously prescribed. 





Pt counseled on against using alcohol to excess and using cocaine, and their 

negative effects on health.





Pt should follow up with PMD, or [facility], 

within 2 weeks of discharge. Call [phone] to make an appointment.





Should symptoms worsen, please head to the Emergency Department for further 

evaluation.





Instructions explained to the pt, who understands and agrees with discharge 

plan.


Referrals: 


[facility] [Outside]

## 2019-03-13 NOTE — CARD
--------------- APPROVED REPORT --------------





Date of service: 03/13/2019



EKG Measurement

Heart [id number]

RI [id number]

[id number]

[id number]

[id number]



<Conclusion>

Normal sinus rhythm

Normal ECG

## 2019-03-13 NOTE — CT
Date of service: 



03/12/2019



PROCEDURE:  CT HEAD WITHOUT CONTRAST.



HISTORY:

syncope/head injury



COMPARISON:

04/25/2017



TECHNIQUE:

Axial computed tomography images were obtained through the head/brain 

without intravenous contrast.  



Radiation dose:



Total exam DLP = 982.0 mGy-cm.



This CT exam was performed using one or more of the following dose 

reduction techniques: Automated exposure control, adjustment of the 

mA and/or kV according to patient size, and/or use of iterative 

reconstruction technique.



FINDINGS:



HEMORRHAGE:

No intracranial hemorrhage. 



BRAIN:

No mass effect or edema.  No atrophy or chronic microvascular 

ischemic changes.



VENTRICLES:

Unremarkable. No hydrocephalus. 



CALVARIUM:

Unremarkable.



PARANASAL SINUSES:

Mild mucosal thickening of the left maxillary sinus and ethmoid air 

cells.



MASTOID AIR CELLS:

Unremarkable as visualized. No inflammatory changes.



OTHER FINDINGS:

Soft tissue swelling overlying the right frontal soft tissues.



IMPRESSION:

No acute intracranial abnormality. 



Mild sinus mucosal disease. 



If symptoms persist, consider correlation with MRI. 



A preliminary report was generated at 8:55 p.m. on 03/12/2018 by Dr. Kit Rahman from [facility].

## 2019-03-13 NOTE — RAD
Date of service: 



03/12/2019



PROCEDURE:  CHEST RADIOGRAPH, 1 VIEW



HISTORY:

chest pain



COMPARISON:

Comparison is made with 01/29/2018



FINDINGS:



LUNGS:

No evidence of new infiltrate or consolidation in the lungs



PLEURA:

No pneumothorax or pleural fluid seen.



CARDIOVASCULAR:

 No aortic atherosclerotic calcification present. Normal.



OSSEOUS STRUCTURES:

No significant abnormalities.



VISUALIZED UPPER ABDOMEN:

Normal.



OTHER FINDINGS:

None. 



IMPRESSION:

No active disease.

## 2019-03-13 NOTE — US
Date of service: 



03/13/2019



HISTORY:

RUQ pain, positive narayanan sign, ? CVA tenderness



COMPARISON:

Comparison is made with the previous CT of the abdomen dated 

03/28/2013 previous ultrasound of the abdomen dated 01/05/2015



TECHNIQUE:

Sonographic evaluation of the abdomen.



FINDINGS:



LIVER:

Measures 17 cm.  Normal echogenicity of the liver parenchyma. No 

mass. No intrahepatic bile duct dilatation.



GALLBLADDER:

Unremarkable. No gallstones.



COMMON BILE DUCT:

Measures 4.7 mm. No stones. No dilatation.



PANCREAS:

Unremarkable as visualized. No mass. No ductal dilatation.



RIGHT KIDNEY:

Measures 9.9 x 5.3 x 6.9cm.  Normal echogenicity. No calculus, mass, 

or hydronephrosis.



LEFT KIDNEY:

Measures 8.8 x 5.2 x 4.5cm.  Normal echogenicity. No calculus, mass, 

or hydronephrosis.



SPLEEN:

Normal in size and contour. No mass.



AORTA:

No aneurysmal dilatation. 



IVC:

Unremarkable. 



OTHER FINDINGS:

None. 



IMPRESSION:

Mild no ultrasound evidence of acute pathology in the right upper 

quadrant.

## 2019-03-13 NOTE — CARD
--------------- APPROVED REPORT --------------





Date of service: 03/13/2019



EKG Measurement

Heart [id number]

ID [id number]

[id number]

[id number]

[id number]



<Conclusion>

Sinus bradycardia

Septal infarct, age undetermined

Abnormal ECG